# Patient Record
(demographics unavailable — no encounter records)

---

## 2024-10-31 NOTE — HISTORY OF PRESENT ILLNESS
[FreeTextEntry1] : 37 year M here for assessment of obesity   Summary:   2023 was placed on wegovy for adjunctive medical weight loss ---> had insurance/ coverage issues  Transitioned to zepbound pen injector however had coverage issues.   Recently able to get vials of zepbound 2.5mg from pharmacy, reporting paid for it.   No n/v/d/abd pain    History of the following:   Thyroid disease: No Heart disease: No Mood disorder: No Hypertension: Yes Seizures: No

## 2025-01-31 NOTE — HISTORY OF PRESENT ILLNESS
[FreeTextEntry1] : 37 year M here for assessment of obesity   Summary:   2023 was placed on wegovy for adjunctive medical weight loss ---> had insurance/ coverage issues  Transitioned to zepbound pen injector however had coverage issues.   Recently able to get vials of zepbound 2.5mg from pharmacy, reporting paid for it.    Currently on zepbound 5mg vials via [facility]  No n/v/d/abd pain    History of the following:   Thyroid disease: No Heart disease: No Mood disorder: No Hypertension: Yes Seizures: No

## 2025-02-28 NOTE — ASSESSMENT
[FreeTextEntry1] : We will do routine blood work in the form of CBC, CMP, A1c, lipid, TSH, B12 folate at this point.   he may follow-up earlier if needed Pt was told to call with problems or concerns. The patient was told to seek immediate attention by calling 911 or going to the ER if his condition does not improve or gets acutely worse.

## 2025-02-28 NOTE — PHYSICAL EXAM
[Normal] : normal rate, regular rhythm, normal S1 and S2 and no murmur heard [No Varicosities] : no varicosities [Pedal Pulses Present] : the pedal pulses are present [No Edema] : there was no peripheral edema [No Extremity Clubbing/Cyanosis] : no extremity clubbing/cyanosis [Soft] : abdomen soft [Non Tender] : non-tender [Non-distended] : non-distended [Normal Bowel Sounds] : normal bowel sounds Spouse/Significant other

## 2025-02-28 NOTE — HISTORY OF PRESENT ILLNESS
[FreeTextEntry1] : Follow-up for hypertension [de-identified] : 37-year-old male is seen today for a follow-up appointment.  Patient is here for follow-up for medical comorbidities / medical problems.  Patient denies any fevers, chills, nausea, vomiting, diarrhea, constipation, weakness, numbness, tingling at this time.  Patient reports that he has had 1 covid vaccine (j and j).  Patient denies any chest pain or any shortness of breath at this present time  Alcohol Use: Yes. occasionally (once a week (wine or scotch))   DAST-1: In the past 12 months have you used drugs other than those required for medical reasons?  Denies.  States that he quit using Marijuana in 2023. Physical Activities: States that he is better with his exercise.   Diet: States that he is better with his diet.   Barriers To Care: None.   Living Situation: lives with significant other, lives with family, Wife and 4 kids.   Employment Status: employed and is a . Education: graduate school ([employer]).   Relationship Status: .   Kids; 4 ADLs: Fully functional (bathing, dressing, toileting, transferring, walking, feeding).   IADLs: Fully functional and needs no help or supervision to perform IADLs (using the telephone, shopping, preparing meals, housekeeping, doing laundry, using transportation, managing medications and managing finances).

## 2025-05-02 NOTE — HISTORY OF PRESENT ILLNESS
[FreeTextEntry1] : 37 year M here for assessment of obesity   Summary:   2023 was placed on wegovy for adjunctive medical weight loss ---> had insurance/ coverage issues  Transitioned to zepbound pen injector however had coverage issues.   Recently able to get vials of zepbound  from pharmacy, reporting paid for it.    Currently on zepbound 7.5mg vials via [facility]  No n/v/d/abd pain    History of the following:   Thyroid disease: No Heart disease: No Mood disorder: No Hypertension: Yes Seizures: No